# Patient Record
Sex: FEMALE | Race: BLACK OR AFRICAN AMERICAN | NOT HISPANIC OR LATINO | Employment: PART TIME | URBAN - METROPOLITAN AREA
[De-identification: names, ages, dates, MRNs, and addresses within clinical notes are randomized per-mention and may not be internally consistent; named-entity substitution may affect disease eponyms.]

---

## 2023-04-17 ENCOUNTER — OFFICE VISIT (OUTPATIENT)
Dept: PULMONOLOGY | Facility: CLINIC | Age: 18
End: 2023-04-17
Payer: COMMERCIAL

## 2023-04-17 VITALS
HEIGHT: 61 IN | WEIGHT: 228.2 LBS | SYSTOLIC BLOOD PRESSURE: 143 MMHG | TEMPERATURE: 98.2 F | OXYGEN SATURATION: 97 % | BODY MASS INDEX: 43.08 KG/M2 | DIASTOLIC BLOOD PRESSURE: 98 MMHG | RESPIRATION RATE: 16 BRPM | HEART RATE: 90 BPM

## 2023-04-17 DIAGNOSIS — J45.20 MILD INTERMITTENT ASTHMA, UNSPECIFIED WHETHER COMPLICATED: Primary | ICD-10-CM

## 2023-04-17 LAB — EXHALED NITROUS OXIDE: 7

## 2023-04-17 RX ORDER — FLUTICASONE PROPIONATE 110 UG/1
1 AEROSOL, METERED RESPIRATORY (INHALATION)
Qty: 1 EACH | Refills: 3 | Status: SHIPPED | OUTPATIENT
Start: 2023-04-17

## 2023-04-17 RX ORDER — ALBUTEROL SULFATE 90 UG/1
AEROSOL, METERED RESPIRATORY (INHALATION)
COMMUNITY
Start: 2023-03-18

## 2023-04-17 NOTE — PROGRESS NOTES
"    Pulmonary Consultation    Ricardo Lombardi MD,    Thank you for asking me to see Gerri Hoffman for   Chief Complaint   Patient presents with   • Shortness of Breath     When active   • Establish Care   • Wheezing     When active   .      History of Present Illness  Gerri Hoffman is a 17 y.o. female with a PMH significant for bronchial asthma since childhood presents for evaluation on account of shortness of breath on exertion along with cough and wheezing off-and-on patient has recently started taking albuterol inhaler on an as-needed basis patient denies any fever or significant expectoration       Tobacco use history:  Never smoker      Review of Systems: History obtained from chart review and the patient.  Review of Systems   Respiratory: Positive for shortness of breath and wheezing.    All other systems reviewed and are negative.    As described in the HPI. Otherwise, remainder of ROS (14 systems) were negative.    There is no problem list on file for this patient.        Current Outpatient Medications:   •  Ventolin  (90 Base) MCG/ACT inhaler, inhale TWO puffs BY MOUTH EVERY 4 HOURS AS NEEDED for 30 days, Disp: , Rfl:     No Known Allergies    History reviewed. No pertinent past medical history.  History reviewed. No pertinent surgical history.  Social History     Socioeconomic History   • Marital status: Single   Tobacco Use   • Smoking status: Never     Passive exposure: Past   • Smokeless tobacco: Never   Vaping Use   • Vaping Use: Never used   Substance and Sexual Activity   • Alcohol use: Never   • Drug use: Never   • Sexual activity: Defer     Family History   Problem Relation Age of Onset   • Asthma Mother    • Asthma Brother    • Asthma Maternal Grandmother        No radiology results for the last 90 days.        Objective     Blood pressure 143/98, pulse 90, temperature 98.2 °F (36.8 °C), temperature source Tympanic, resp. rate 16, height 154.9 cm (61\"), weight 104 kg (228 lb 3.2 " oz), SpO2 97 %.  Physical Exam  Vitals and nursing note reviewed.   Constitutional:       Appearance: Normal appearance. She is obese.   HENT:      Head: Normocephalic and atraumatic.      Nose: Nose normal.      Mouth/Throat:      Mouth: Mucous membranes are moist.      Pharynx: Oropharynx is clear.   Eyes:      Extraocular Movements: Extraocular movements intact.      Conjunctiva/sclera: Conjunctivae normal.      Pupils: Pupils are equal, round, and reactive to light.   Cardiovascular:      Rate and Rhythm: Normal rate and regular rhythm.      Pulses: Normal pulses.      Heart sounds: Normal heart sounds.   Pulmonary:      Effort: Pulmonary effort is normal.      Breath sounds: Normal breath sounds.   Abdominal:      General: Abdomen is flat. Bowel sounds are normal.      Palpations: Abdomen is soft.   Musculoskeletal:         General: Normal range of motion.      Cervical back: Normal range of motion and neck supple.   Skin:     General: Skin is warm.      Capillary Refill: Capillary refill takes 2 to 3 seconds.   Neurological:      General: No focal deficit present.      Mental Status: She is alert and oriented to person, place, and time.   Psychiatric:         Mood and Affect: Mood normal.         Behavior: Behavior normal.       Immunization History   Administered Date(s) Administered   • DTaP, Unspecified 2003, 03/01/2004, 05/14/2004, 12/30/2004, 10/24/2007   • HPV Quadrivalent 09/24/2015   • Hep A, 2 Dose 03/10/2014, 12/10/2020   • Hep B, Adolescent or Pediatric 2003, 05/14/2004, 08/13/2004   • Hpv9 12/10/2020   • IPV 2003, 03/01/2004, 03/30/2005, 10/24/2007   • Influenza Quad Vaccine (Inpatient) 09/24/2015   • MMR 09/30/2004, 10/24/2007   • Meningococcal B,(Bexsero) 12/10/2020   • Meningococcal Conjugate 12/10/2020   • Tdap 09/24/2015   • Varicella 09/30/2004, 10/24/2007            Assessment & Plan     Diagnoses and all orders for this visit:    1. Mild intermittent asthma, unspecified  whether complicated (Primary)  -     Full Pulmonary Function Test With Bronchodilator; Future  -     XR Chest 2 View; Future  -     POCT FENO Test         Discussion/ Recommendations:   Patient is advised to reduce weight her BMI is 43.12  Patient is advised to continue her albuterol inhaler every 6 hours as needed  Flovent inhaler twice daily  We will order chest x-ray PFT and Pankaj testing  Discussed vaccination and recommended    Class 3 Severe Obesity (BMI >=40). Obesity-related health conditions include the following: none. Obesity is unchanged. BMI is is above average; BMI management plan is completed. We discussed low calorie, low carb based diet program, portion control and increasing exercise.           Return in about 4 weeks (around 5/15/2023).      Thank you for allowing me to participate in the care of Gerri GARCIA Dalton. Please do not hesitate to contact me with any questions.         This document has been electronically signed by Ivan Encinas MD on April 17, 2023 15:42 EDT

## 2023-05-05 ENCOUNTER — HOSPITAL ENCOUNTER (OUTPATIENT)
Dept: GENERAL RADIOLOGY | Facility: HOSPITAL | Age: 18
Discharge: HOME | End: 2023-05-05
Payer: COMMERCIAL

## 2023-05-05 ENCOUNTER — HOSPITAL ENCOUNTER (OUTPATIENT)
Dept: RESPIRATORY THERAPY | Facility: HOSPITAL | Age: 18
Discharge: HOME | End: 2023-05-05
Payer: COMMERCIAL

## 2023-05-05 DIAGNOSIS — J45.20 MILD INTERMITTENT ASTHMA, UNSPECIFIED WHETHER COMPLICATED: ICD-10-CM

## 2023-05-05 PROCEDURE — 94729 DIFFUSING CAPACITY: CPT

## 2023-05-05 PROCEDURE — 94010 BREATHING CAPACITY TEST: CPT

## 2023-05-05 PROCEDURE — 94726 PLETHYSMOGRAPHY LUNG VOLUMES: CPT

## 2023-05-05 PROCEDURE — 71046 X-RAY EXAM CHEST 2 VIEWS: CPT

## 2023-05-05 RX ORDER — LEVALBUTEROL INHALATION SOLUTION 1.25 MG/3ML
1.25 SOLUTION RESPIRATORY (INHALATION) ONCE
Status: COMPLETED | OUTPATIENT
Start: 2023-05-05 | End: 2023-05-05

## 2023-05-05 RX ADMIN — LEVALBUTEROL HYDROCHLORIDE 1.25 MG: 1.25 SOLUTION RESPIRATORY (INHALATION) at 15:50

## 2023-05-16 ENCOUNTER — OFFICE VISIT (OUTPATIENT)
Dept: PULMONOLOGY | Facility: CLINIC | Age: 18
End: 2023-05-16
Payer: COMMERCIAL

## 2023-05-16 VITALS
DIASTOLIC BLOOD PRESSURE: 82 MMHG | HEIGHT: 61 IN | HEART RATE: 79 BPM | BODY MASS INDEX: 43.08 KG/M2 | TEMPERATURE: 97.7 F | SYSTOLIC BLOOD PRESSURE: 130 MMHG | OXYGEN SATURATION: 98 % | WEIGHT: 228.2 LBS

## 2023-05-16 DIAGNOSIS — J45.20 MILD INTERMITTENT ASTHMA, UNSPECIFIED WHETHER COMPLICATED: Primary | ICD-10-CM

## 2023-05-16 DIAGNOSIS — E66.01 MORBID (SEVERE) OBESITY DUE TO EXCESS CALORIES: ICD-10-CM

## 2023-05-16 NOTE — PROGRESS NOTES
Pulmonary Office Follow-up    Subjective     Gerri Hoffman is seen today at the office for   Chief Complaint   Patient presents with   • Asthma   • Follow-up     4 wk fu   • Results     PFT and Chest XR   • Wheezing   • Cough   • Shortness of Breath         HPI  Gerri Hoffman is a 17 y.o. female with a PMH significant for asthma presents for follow-up she appears to be doing well and denies any shortness of breath cough or wheeze at this time she denies any chest pain and is trying to work on reducing her weight      Tobacco use history:  Never smoker      There is no problem list on file for this patient.      Review of Systems  Review of Systems   Respiratory: Positive for shortness of breath.    All other systems reviewed and are negative.    As described in the HPI. Otherwise, remainder of ROS (14 systems) were negative.    Medications, Allergies, Social, and Family Histories reviewed as per EMR.    Objective     Vitals:    05/16/23 0859   BP: 130/82   Pulse: 79   Temp: 97.7 °F (36.5 °C)   SpO2: 98%         05/16/23  0859   Weight: 104 kg (228 lb 3.2 oz)       Physical Exam  Vitals and nursing note reviewed.   Constitutional:       Appearance: Normal appearance. She is obese.   HENT:      Head: Normocephalic and atraumatic.      Nose: Nose normal.      Mouth/Throat:      Mouth: Mucous membranes are moist.      Pharynx: Oropharynx is clear.   Eyes:      Extraocular Movements: Extraocular movements intact.      Conjunctiva/sclera: Conjunctivae normal.      Pupils: Pupils are equal, round, and reactive to light.   Cardiovascular:      Rate and Rhythm: Normal rate and regular rhythm.      Pulses: Normal pulses.      Heart sounds: Normal heart sounds.   Pulmonary:      Effort: Pulmonary effort is normal.      Breath sounds: Normal breath sounds.   Abdominal:      General: Abdomen is flat. Bowel sounds are normal.      Palpations: Abdomen is soft.   Musculoskeletal:         General: Normal range of motion.       Cervical back: Normal range of motion and neck supple.   Skin:     General: Skin is warm.      Capillary Refill: Capillary refill takes 2 to 3 seconds.   Neurological:      General: No focal deficit present.      Mental Status: She is alert and oriented to person, place, and time.   Psychiatric:         Mood and Affect: Mood normal.         Behavior: Behavior normal.         XR Chest 2 View    Result Date: 5/5/2023   No active disease is seen.     BEE REN MD       Electronically Signed and Approved By: BEE REN MD on 5/05/2023 at 19:42                Assessment & Plan     Diagnoses and all orders for this visit:    1. Mild intermittent asthma, unspecified whether complicated (Primary)    2. Morbid (severe) obesity due to excess calories         Discussion/ Recommendations:   Patient is advised to reduce weight and regular exercise her BMI is 43.12  Advised to continue her present medications  Chest x-ray was reviewed no infiltrates were noted  PFTs are within normal limits  Vaccinations discussed and recommended    Class 3 Severe Obesity (BMI >=40). Obesity-related health conditions include the following: none. Obesity is unchanged. BMI is is above average; BMI management plan is completed. We discussed low calorie, low carb based diet program, portion control and increasing exercise.        Return in about 2 months (around 7/16/2023).          This document has been electronically signed by Ivan Encinas MD on May 16, 2023 09:10 EDT